# Patient Record
Sex: FEMALE | Race: WHITE | Employment: OTHER | URBAN - NONMETROPOLITAN AREA
[De-identification: names, ages, dates, MRNs, and addresses within clinical notes are randomized per-mention and may not be internally consistent; named-entity substitution may affect disease eponyms.]

---

## 2017-10-09 ENCOUNTER — OFFICE VISIT (OUTPATIENT)
Dept: INTERNAL MEDICINE CLINIC | Age: 77
End: 2017-10-09
Payer: COMMERCIAL

## 2017-10-09 ENCOUNTER — HOSPITAL ENCOUNTER (OUTPATIENT)
Age: 77
Discharge: HOME | End: 2017-10-09
Payer: MEDICARE

## 2017-10-09 VITALS
DIASTOLIC BLOOD PRESSURE: 80 MMHG | HEIGHT: 62 IN | WEIGHT: 151 LBS | BODY MASS INDEX: 27.79 KG/M2 | HEART RATE: 68 BPM | SYSTOLIC BLOOD PRESSURE: 154 MMHG

## 2017-10-09 DIAGNOSIS — E04.1 THYROID NODULE: ICD-10-CM

## 2017-10-09 DIAGNOSIS — I48.0 PAF (PAROXYSMAL ATRIAL FIBRILLATION) (HCC): ICD-10-CM

## 2017-10-09 DIAGNOSIS — I10 ESSENTIAL HYPERTENSION: ICD-10-CM

## 2017-10-09 DIAGNOSIS — E11.9 CONTROLLED TYPE 2 DIABETES MELLITUS WITHOUT COMPLICATION, WITHOUT LONG-TERM CURRENT USE OF INSULIN (HCC): ICD-10-CM

## 2017-10-09 DIAGNOSIS — E04.1 THYROID NODULE: Primary | ICD-10-CM

## 2017-10-09 LAB
T4 FREE: 1.15 NG/DL (ref 0.93–1.76)
TSH SERPL DL<=0.05 MIU/L-ACNC: 5.27 UIU/ML (ref 0.4–4.2)

## 2017-10-09 PROCEDURE — 84443 ASSAY THYROID STIM HORMONE: CPT

## 2017-10-09 PROCEDURE — 84439 ASSAY OF FREE THYROXINE: CPT

## 2017-10-09 PROCEDURE — 99204 OFFICE O/P NEW MOD 45 MIN: CPT | Performed by: INTERNAL MEDICINE

## 2017-10-09 PROCEDURE — 36415 COLL VENOUS BLD VENIPUNCTURE: CPT

## 2017-10-09 RX ORDER — AMLODIPINE BESYLATE 10 MG/1
10 TABLET ORAL NIGHTLY
Status: ON HOLD | COMMUNITY
Start: 2017-10-04 | End: 2017-12-07 | Stop reason: HOSPADM

## 2017-10-09 RX ORDER — ALLOPURINOL 300 MG/1
300 TABLET ORAL DAILY
COMMUNITY

## 2017-10-09 RX ORDER — VALSARTAN 320 MG/1
320 TABLET ORAL DAILY
Status: ON HOLD | COMMUNITY
Start: 2017-09-18 | End: 2017-12-07 | Stop reason: HOSPADM

## 2017-10-09 RX ORDER — ANASTROZOLE 1 MG/1
1 TABLET ORAL DAILY
COMMUNITY
Start: 2017-08-01 | End: 2020-01-01

## 2017-10-09 RX ORDER — UBIDECARENONE 75 MG
200 CAPSULE ORAL DAILY
COMMUNITY

## 2017-10-09 RX ORDER — SOTALOL HYDROCHLORIDE 80 MG/1
80 TABLET ORAL DAILY
COMMUNITY
Start: 2017-09-18 | End: 2017-12-05

## 2017-10-09 RX ORDER — M-VIT,TX,IRON,MINS/CALC/FOLIC 27MG-0.4MG
1 TABLET ORAL DAILY
COMMUNITY
End: 2018-05-09 | Stop reason: ALTCHOICE

## 2017-10-09 RX ORDER — APIXABAN 5 MG/1
5 TABLET, FILM COATED ORAL 2 TIMES DAILY
COMMUNITY
Start: 2017-10-04

## 2017-10-09 RX ORDER — ATORVASTATIN CALCIUM 40 MG/1
40 TABLET, FILM COATED ORAL DAILY
COMMUNITY
Start: 2017-07-27

## 2017-10-09 RX ORDER — PANTOPRAZOLE SODIUM 40 MG/1
40 TABLET, DELAYED RELEASE ORAL DAILY
COMMUNITY
Start: 2017-10-04 | End: 2018-05-09 | Stop reason: ALTCHOICE

## 2017-10-09 NOTE — PROGRESS NOTES
[facility] PROFESSIONAL [facility]  PHYSICIANS [place] [address] [place] [address] SHAYLA WEST II.ELEANOR, [place]  Dept: [phone]  Dept Fax: [phone]      Chief Complaint   Patient presents with   Esau Ramos Doctor    Hypothyroidism       Patient presents for evaluation of thyroid nodule. I have never seen the patient before. This patient is followed regularly by Dr. Aleida Klnie. Patient has a history of breast cancer and as part of the follow-up she had a PET/CT scan. This showed a nodule in her thyroid. She subsequently had an ultrasound of the thyroid. She's had no history of any thyroid disease to her knowledge. She's never had radiation or to the chest or neck. There is no history of any thyroid cancer in the family  There is no problem list on file for this patient. Past Medical History:   Diagnosis Date    [facility] ([facility]     Breast cancer ([facility]     Cancer ([facility] 06/2017    Breast    Hyperlipidemia     Hypertension     Type 2 diabetes mellitus without complication (HCC)          Current Outpatient Prescriptions   Medication Sig Dispense Refill    anastrozole (ARIMIDEX) 1 MG tablet Take 1 mg by mouth daily      pantoprazole (PROTONIX) 40 MG tablet Take 40 mg by mouth daily      amLODIPine (NORVASC) 10 MG tablet Take 10 mg by mouth daily      allopurinol (ZYLOPRIM) 300 MG tablet Take 300 mg by mouth daily      sotalol (BETAPACE) 80 MG tablet Take 80 mg by mouth daily      valsartan (DIOVAN) 320 MG tablet Take 320 mg by mouth daily      ELIQUIS 5 MG TABS tablet Take 5 mg by mouth daily      atorvastatin (LIPITOR) 40 MG tablet Take 40 mg by mouth daily      Coenzyme Q10 (CO Q-10) 100 MG CAPS Take 100 mg by mouth daily      Multiple Vitamins-Minerals (THERAPEUTIC MULTIVITAMIN-MINERALS) tablet Take 1 tablet by mouth daily       No current facility-administered medications for this visit.         Past Surgical History:   Procedure Laterality Date    CAROTID ENDARTERECTOMY  01/04/2017    MASTECTOMY Right 06/2017       Allergies   Allergen Reactions    Sulfa Antibiotics Nausea Only       Social History     Social History    Marital status: Single     Spouse name: N/A    Number of children: N/A    Years of education: N/A     Occupational History    Not on file. Social History Main Topics    Smoking status: Never Smoker    Smokeless tobacco: Never Used    Alcohol use No    Drug use: No    Sexual activity: Not on file     Other Topics Concern    Not on file     Social History Narrative    No narrative on file       History reviewed. No pertinent family history. Review of Systems - General ROS: negative  Psychological ROS: negative  Hematological and Lymphatic ROS: negative  Respiratory ROS: no cough, shortness of breath, or wheezing  Cardiovascular ROS: no chest pain or dyspnea on exertion  Gastrointestinal ROS: no abdominal pain, change in bowel habits, or black or bloody stools  Genito-Urinary ROS: no dysuria, trouble voiding, or hematuria  Musculoskeletal ROS: negative  Neurological ROS: no TIA or stroke symptoms  Dermatological ROS: negative    Blood pressure (!) 154/80, pulse 68, height 5' 2\" (1.575 m), weight 151 lb (68.5 kg).     Physical Examination: General appearance - alert, well appearing, and in no distress  Mental status - alert, oriented to person, place, and time  Neck - supple, no significant adenopathy, no thyromegaly or nodules palpable  Chest - clear to auscultation, no wheezes, rales or rhonchi, symmetric air entry  Heart - normal rate, regular rhythm, normal S1, S2, no murmurs, rubs, clicks or gallops  Abdomen - soft, nontender, nondistended, no masses or organomegaly  Neurological - alert, oriented, normal speech, no focal findings or movement disorder noted  Musculoskeletal - no joint tenderness, deformity or swelling  Extremities - peripheral pulses normal, no pedal edema, no clubbing or cyanosis  Skin - normal coloration and turgor, no rashes, no suspicious

## 2017-10-10 ENCOUNTER — TELEPHONE (OUTPATIENT)
Dept: INTERNAL MEDICINE CLINIC | Age: 77
End: 2017-10-10

## 2017-10-10 ENCOUNTER — HOSPITAL ENCOUNTER (OUTPATIENT)
Dept: ULTRASOUND IMAGING | Age: 77
Discharge: HOME | End: 2017-10-10
Payer: COMMERCIAL

## 2017-10-10 DIAGNOSIS — Z00.6 EXAMINATION FOR NORMAL COMPARISON FOR CLINICAL RESEARCH: ICD-10-CM

## 2017-10-10 PROCEDURE — 3209999900 US COMPARISON OF OUTSIDE FILMS

## 2017-10-10 NOTE — TELEPHONE ENCOUNTER
U/S guided thyroid biopsy is scheduled for 10/26/17 at 11:00am, be in main radiology at 10:30am.  Hold Eloquis 3 days prior, no fasting needed.   Per verbal of Dr. Jodi Serrano it is okay to stop the Eloquis

## 2017-10-26 ENCOUNTER — HOSPITAL ENCOUNTER (OUTPATIENT)
Dept: ULTRASOUND IMAGING | Age: 77
Discharge: HOME | End: 2017-10-26
Payer: MEDICARE

## 2017-10-26 DIAGNOSIS — E04.1 THYROID NODULE: ICD-10-CM

## 2017-10-26 PROCEDURE — 88172 CYTP DX EVAL FNA 1ST EA SITE: CPT

## 2017-10-26 PROCEDURE — 76942 ECHO GUIDE FOR BIOPSY: CPT

## 2017-10-26 PROCEDURE — 88173 CYTOPATH EVAL FNA REPORT: CPT

## 2017-10-26 PROCEDURE — 88177 CYTP FNA EVAL EA ADDL: CPT

## 2017-11-01 ENCOUNTER — OFFICE VISIT (OUTPATIENT)
Dept: INTERNAL MEDICINE CLINIC | Age: 77
End: 2017-11-01
Payer: COMMERCIAL

## 2017-11-01 ENCOUNTER — HOSPITAL ENCOUNTER (OUTPATIENT)
Age: 77
Discharge: HOME | End: 2017-11-01
Payer: COMMERCIAL

## 2017-11-01 VITALS
HEIGHT: 62 IN | HEART RATE: 72 BPM | WEIGHT: 150 LBS | BODY MASS INDEX: 27.6 KG/M2 | SYSTOLIC BLOOD PRESSURE: 180 MMHG | DIASTOLIC BLOOD PRESSURE: 84 MMHG

## 2017-11-01 DIAGNOSIS — E06.3 HASHIMOTO'S THYROIDITIS: ICD-10-CM

## 2017-11-01 DIAGNOSIS — E04.1 THYROID NODULE: Primary | ICD-10-CM

## 2017-11-01 PROCEDURE — 4040F PNEUMOC VAC/ADMIN/RCVD: CPT | Performed by: INTERNAL MEDICINE

## 2017-11-01 PROCEDURE — 3017F COLORECTAL CA SCREEN DOC REV: CPT | Performed by: INTERNAL MEDICINE

## 2017-11-01 PROCEDURE — 1036F TOBACCO NON-USER: CPT | Performed by: INTERNAL MEDICINE

## 2017-11-01 PROCEDURE — G8427 DOCREV CUR MEDS BY ELIG CLIN: HCPCS | Performed by: INTERNAL MEDICINE

## 2017-11-01 PROCEDURE — 1123F ACP DISCUSS/DSCN MKR DOCD: CPT | Performed by: INTERNAL MEDICINE

## 2017-11-01 PROCEDURE — 99213 OFFICE O/P EST LOW 20 MIN: CPT | Performed by: INTERNAL MEDICINE

## 2017-11-01 PROCEDURE — G8419 CALC BMI OUT NRM PARAM NOF/U: HCPCS | Performed by: INTERNAL MEDICINE

## 2017-11-01 PROCEDURE — 1090F PRES/ABSN URINE INCON ASSESS: CPT | Performed by: INTERNAL MEDICINE

## 2017-11-01 PROCEDURE — 86376 MICROSOMAL ANTIBODY EACH: CPT

## 2017-11-01 PROCEDURE — G8400 PT W/DXA NO RESULTS DOC: HCPCS | Performed by: INTERNAL MEDICINE

## 2017-11-01 PROCEDURE — 36415 COLL VENOUS BLD VENIPUNCTURE: CPT

## 2017-11-01 PROCEDURE — G8484 FLU IMMUNIZE NO ADMIN: HCPCS | Performed by: INTERNAL MEDICINE

## 2017-11-01 NOTE — PROGRESS NOTES
5' 2.01\" (1.575 m), weight 150 lb (68 kg). Physical Examination: Not repeated    1. Thyroid nodule     2. Hashimoto's thyroiditis  Thyroid Peroxidase Antibody       Orders Placed This Encounter   Procedures    Thyroid Peroxidase Antibody     Standing Status:   Future     Number of Occurrences:   1     Standing Expiration Date:   11/1/2018   Ultrasound of the thyroid shows a 1.7 cm nodule in the right lobe. TFTs normal.  Percutaneous biopsy showed  benign findings, possible lymphocytic thyroiditis.   We'll check TPO antibody    I  recommend repeat ultrasound 6 months  I discussed the situation at length with patient and family and answered all their questions  Follow-up with me 3 months

## 2017-11-02 LAB — THYROID PEROXIDASE ANTIBODY: 0.8 IU/ML (ref 0–9)

## 2017-12-05 ENCOUNTER — APPOINTMENT (OUTPATIENT)
Dept: GENERAL RADIOLOGY | Age: 77
DRG: 641 | End: 2017-12-05
Payer: MEDICARE

## 2017-12-05 ENCOUNTER — HOSPITAL ENCOUNTER (INPATIENT)
Age: 77
LOS: 2 days | Discharge: HOME | DRG: 641 | End: 2017-12-07
Attending: INTERNAL MEDICINE | Admitting: INTERNAL MEDICINE
Payer: MEDICARE

## 2017-12-05 DIAGNOSIS — D72.829 LEUKOCYTOSIS, UNSPECIFIED TYPE: ICD-10-CM

## 2017-12-05 DIAGNOSIS — E03.9 HYPOTHYROIDISM, UNSPECIFIED TYPE: ICD-10-CM

## 2017-12-05 DIAGNOSIS — R55 NEAR SYNCOPE: Primary | ICD-10-CM

## 2017-12-05 PROBLEM — E11.9 DIABETES TYPE 2, CONTROLLED (HCC): Status: ACTIVE | Noted: 2017-12-05

## 2017-12-05 PROBLEM — I73.9 PAD (PERIPHERAL ARTERY DISEASE) (HCC): Status: ACTIVE | Noted: 2017-12-05

## 2017-12-05 PROBLEM — I25.10 CAD (CORONARY ARTERY DISEASE): Status: ACTIVE | Noted: 2017-12-05

## 2017-12-05 PROBLEM — Z92.29 HX: ANTICOAGULATION: Status: ACTIVE | Noted: 2017-12-05

## 2017-12-05 PROBLEM — I48.21 ATRIAL FIBRILLATION, PERMANENT (HCC): Status: ACTIVE | Noted: 2017-12-05

## 2017-12-05 PROBLEM — Z87.19 HISTORY OF GASTROESOPHAGEAL REFLUX (GERD): Status: ACTIVE | Noted: 2017-12-05

## 2017-12-05 PROBLEM — C50.919 BREAST CANCER (HCC): Status: ACTIVE | Noted: 2017-12-05

## 2017-12-05 PROBLEM — I10 ESSENTIAL HYPERTENSION: Status: ACTIVE | Noted: 2017-12-05

## 2017-12-05 LAB
ALBUMIN SERPL-MCNC: 4.3 G/DL (ref 3.5–5.1)
ALP BLD-CCNC: 105 U/L (ref 38–126)
ALT SERPL-CCNC: 18 U/L (ref 11–66)
ANION GAP SERPL CALCULATED.3IONS-SCNC: 18 MEQ/L (ref 8–16)
AST SERPL-CCNC: 19 U/L (ref 5–40)
BACTERIA: ABNORMAL /HPF
BASOPHILS # BLD: 0.7 %
BASOPHILS ABSOLUTE: 0.1 THOU/MM3 (ref 0–0.1)
BILIRUB SERPL-MCNC: 0.6 MG/DL (ref 0.3–1.2)
BILIRUBIN DIRECT: < 0.2 MG/DL (ref 0–0.3)
BILIRUBIN URINE: ABNORMAL
BLOOD, URINE: NEGATIVE
BUN BLDV-MCNC: 13 MG/DL (ref 7–22)
CALCIUM SERPL-MCNC: 9.6 MG/DL (ref 8.5–10.5)
CASTS 2: ABNORMAL /LPF
CASTS UA: ABNORMAL /LPF
CHARACTER, URINE: ABNORMAL
CHLORIDE BLD-SCNC: 100 MEQ/L (ref 98–111)
CO2: 21 MEQ/L (ref 23–33)
COLOR: YELLOW
CREAT SERPL-MCNC: 1.3 MG/DL (ref 0.4–1.2)
CRYSTALS, UA: ABNORMAL
EKG ATRIAL RATE: 75 BPM
EKG P AXIS: 15 DEGREES
EKG P-R INTERVAL: 180 MS
EKG Q-T INTERVAL: 380 MS
EKG QRS DURATION: 84 MS
EKG QTC CALCULATION (BAZETT): 424 MS
EKG R AXIS: 21 DEGREES
EKG T AXIS: 75 DEGREES
EKG VENTRICULAR RATE: 75 BPM
EOSINOPHIL # BLD: 0.7 %
EOSINOPHILS ABSOLUTE: 0.1 THOU/MM3 (ref 0–0.4)
EPITHELIAL CELLS, UA: ABNORMAL /HPF
FLU A ANTIGEN: NEGATIVE
FLU B ANTIGEN: NEGATIVE
GFR SERPL CREATININE-BSD FRML MDRD: 40 ML/MIN/1.73M2
GLUCOSE BLD-MCNC: 122 MG/DL (ref 70–108)
GLUCOSE URINE: NEGATIVE MG/DL
HCT VFR BLD CALC: 40.1 % (ref 37–47)
HEMOGLOBIN: 13.1 GM/DL (ref 12–16)
ICTOTEST: NEGATIVE
INR BLD: 1.43 (ref 0.85–1.13)
KETONES, URINE: NEGATIVE
LEUKOCYTE ESTERASE, URINE: ABNORMAL
LIPASE: 83.6 U/L (ref 5.6–51.3)
LYMPHOCYTES # BLD: 7.9 %
LYMPHOCYTES ABSOLUTE: 1.2 THOU/MM3 (ref 1–4.8)
MCH RBC QN AUTO: 30.3 PG (ref 27–31)
MCHC RBC AUTO-ENTMCNC: 32.7 GM/DL (ref 33–37)
MCV RBC AUTO: 92.5 FL (ref 81–99)
MISCELLANEOUS 2: ABNORMAL
MONOCYTES # BLD: 6.7 %
MONOCYTES ABSOLUTE: 1 THOU/MM3 (ref 0.4–1.3)
MUCUS: ABNORMAL
NITRITE, URINE: NEGATIVE
NUCLEATED RED BLOOD CELLS: 0 /100 WBC
OSMOLALITY CALCULATION: 279 MOSMOL/KG (ref 275–300)
PDW BLD-RTO: 14.1 % (ref 11.5–14.5)
PH UA: 5
PLATELET # BLD: 280 THOU/MM3 (ref 130–400)
PMV BLD AUTO: 8.4 MCM (ref 7.4–10.4)
POTASSIUM SERPL-SCNC: 4.4 MEQ/L (ref 3.5–5.2)
PROTEIN UA: 30
RBC # BLD: 4.33 MILL/MM3 (ref 4.2–5.4)
RBC URINE: ABNORMAL /HPF
RENAL EPITHELIAL, UA: ABNORMAL
SEG NEUTROPHILS: 84 %
SEGMENTED NEUTROPHILS ABSOLUTE COUNT: 12.3 THOU/MM3 (ref 1.8–7.7)
SODIUM BLD-SCNC: 139 MEQ/L (ref 135–145)
SPECIFIC GRAVITY, URINE: 1.02 (ref 1–1.03)
TOTAL PROTEIN: 7.4 G/DL (ref 6.1–8)
TROPONIN T: < 0.01 NG/ML
TSH SERPL DL<=0.05 MIU/L-ACNC: 8.38 UIU/ML (ref 0.4–4.2)
UROBILINOGEN, URINE: 0.2 EU/DL
WBC # BLD: 14.7 THOU/MM3 (ref 4.8–10.8)
WBC UA: ABNORMAL /HPF
YEAST: ABNORMAL

## 2017-12-05 PROCEDURE — 82248 BILIRUBIN DIRECT: CPT

## 2017-12-05 PROCEDURE — 85610 PROTHROMBIN TIME: CPT

## 2017-12-05 PROCEDURE — 81001 URINALYSIS AUTO W/SCOPE: CPT

## 2017-12-05 PROCEDURE — 84443 ASSAY THYROID STIM HORMONE: CPT

## 2017-12-05 PROCEDURE — 93005 ELECTROCARDIOGRAM TRACING: CPT

## 2017-12-05 PROCEDURE — 71020 XR CHEST STANDARD TWO VW: CPT

## 2017-12-05 PROCEDURE — 87086 URINE CULTURE/COLONY COUNT: CPT

## 2017-12-05 PROCEDURE — 99220 PR INITIAL OBSERVATION CARE/DAY 70 MINUTES: CPT | Performed by: NURSE PRACTITIONER

## 2017-12-05 PROCEDURE — G0378 HOSPITAL OBSERVATION PER HR: HCPCS

## 2017-12-05 PROCEDURE — 83690 ASSAY OF LIPASE: CPT

## 2017-12-05 PROCEDURE — 87804 INFLUENZA ASSAY W/OPTIC: CPT

## 2017-12-05 PROCEDURE — 1200000003 HC TELEMETRY R&B

## 2017-12-05 PROCEDURE — 84484 ASSAY OF TROPONIN QUANT: CPT

## 2017-12-05 PROCEDURE — 80053 COMPREHEN METABOLIC PANEL: CPT

## 2017-12-05 PROCEDURE — 99285 EMERGENCY DEPT VISIT HI MDM: CPT

## 2017-12-05 PROCEDURE — 85025 COMPLETE CBC W/AUTO DIFF WBC: CPT

## 2017-12-05 PROCEDURE — 36415 COLL VENOUS BLD VENIPUNCTURE: CPT

## 2017-12-05 RX ORDER — NICOTINE POLACRILEX 4 MG
15 LOZENGE BUCCAL PRN
Status: DISCONTINUED | OUTPATIENT
Start: 2017-12-05 | End: 2017-12-07 | Stop reason: HOSPADM

## 2017-12-05 RX ORDER — METFORMIN HYDROCHLORIDE 500 MG/1
500 TABLET, EXTENDED RELEASE ORAL
COMMUNITY

## 2017-12-05 RX ORDER — ONDANSETRON 2 MG/ML
4 INJECTION INTRAMUSCULAR; INTRAVENOUS EVERY 6 HOURS PRN
Status: DISCONTINUED | OUTPATIENT
Start: 2017-12-05 | End: 2017-12-07 | Stop reason: HOSPADM

## 2017-12-05 RX ORDER — ANASTROZOLE 1 MG/1
1 TABLET ORAL DAILY
Status: DISCONTINUED | OUTPATIENT
Start: 2017-12-06 | End: 2017-12-07 | Stop reason: HOSPADM

## 2017-12-05 RX ORDER — SODIUM CHLORIDE 0.9 % (FLUSH) 0.9 %
10 SYRINGE (ML) INJECTION EVERY 12 HOURS SCHEDULED
Status: DISCONTINUED | OUTPATIENT
Start: 2017-12-05 | End: 2017-12-07 | Stop reason: HOSPADM

## 2017-12-05 RX ORDER — ALLOPURINOL 300 MG/1
300 TABLET ORAL DAILY
Status: DISCONTINUED | OUTPATIENT
Start: 2017-12-06 | End: 2017-12-07 | Stop reason: HOSPADM

## 2017-12-05 RX ORDER — AMLODIPINE BESYLATE 10 MG/1
10 TABLET ORAL DAILY
Status: DISCONTINUED | OUTPATIENT
Start: 2017-12-06 | End: 2017-12-06

## 2017-12-05 RX ORDER — DOCUSATE SODIUM 100 MG/1
100 CAPSULE, LIQUID FILLED ORAL 2 TIMES DAILY PRN
Status: DISCONTINUED | OUTPATIENT
Start: 2017-12-05 | End: 2017-12-07 | Stop reason: HOSPADM

## 2017-12-05 RX ORDER — ACETAMINOPHEN 325 MG/1
650 TABLET ORAL EVERY 4 HOURS PRN
Status: DISCONTINUED | OUTPATIENT
Start: 2017-12-05 | End: 2017-12-05 | Stop reason: SDUPTHER

## 2017-12-05 RX ORDER — ACETAMINOPHEN 325 MG/1
650 TABLET ORAL EVERY 4 HOURS PRN
Status: DISCONTINUED | OUTPATIENT
Start: 2017-12-05 | End: 2017-12-07 | Stop reason: HOSPADM

## 2017-12-05 RX ORDER — SODIUM CHLORIDE 0.9 % (FLUSH) 0.9 %
10 SYRINGE (ML) INJECTION PRN
Status: DISCONTINUED | OUTPATIENT
Start: 2017-12-05 | End: 2017-12-07 | Stop reason: HOSPADM

## 2017-12-05 RX ORDER — PANTOPRAZOLE SODIUM 40 MG/1
40 TABLET, DELAYED RELEASE ORAL
Status: DISCONTINUED | OUTPATIENT
Start: 2017-12-06 | End: 2017-12-07 | Stop reason: HOSPADM

## 2017-12-05 RX ORDER — SODIUM CHLORIDE 0.9 % (FLUSH) 0.9 %
10 SYRINGE (ML) INJECTION PRN
Status: DISCONTINUED | OUTPATIENT
Start: 2017-12-05 | End: 2017-12-05 | Stop reason: SDUPTHER

## 2017-12-05 RX ORDER — DEXTROSE AND SODIUM CHLORIDE 5; .9 G/100ML; G/100ML
100 INJECTION, SOLUTION INTRAVENOUS PRN
Status: DISCONTINUED | OUTPATIENT
Start: 2017-12-05 | End: 2017-12-07 | Stop reason: HOSPADM

## 2017-12-05 RX ORDER — ONDANSETRON 2 MG/ML
4 INJECTION INTRAMUSCULAR; INTRAVENOUS EVERY 8 HOURS PRN
Status: DISCONTINUED | OUTPATIENT
Start: 2017-12-05 | End: 2017-12-06 | Stop reason: SDUPTHER

## 2017-12-05 RX ORDER — M-VIT,TX,IRON,MINS/CALC/FOLIC 27MG-0.4MG
1 TABLET ORAL DAILY
Status: DISCONTINUED | OUTPATIENT
Start: 2017-12-06 | End: 2017-12-07 | Stop reason: HOSPADM

## 2017-12-05 RX ORDER — VALSARTAN 320 MG/1
320 TABLET ORAL DAILY
Status: DISCONTINUED | OUTPATIENT
Start: 2017-12-06 | End: 2017-12-06

## 2017-12-05 RX ORDER — AMIODARONE HYDROCHLORIDE 200 MG/1
200 TABLET ORAL DAILY
Status: DISCONTINUED | OUTPATIENT
Start: 2017-12-06 | End: 2017-12-07 | Stop reason: HOSPADM

## 2017-12-05 RX ORDER — DIMENHYDRINATE 50 MG
100 TABLET ORAL DAILY
Status: DISCONTINUED | OUTPATIENT
Start: 2017-12-06 | End: 2017-12-06 | Stop reason: RX

## 2017-12-05 RX ORDER — AMIODARONE HYDROCHLORIDE 200 MG/1
200 TABLET ORAL DAILY
COMMUNITY
End: 2018-05-09 | Stop reason: ALTCHOICE

## 2017-12-05 RX ORDER — DEXTROSE MONOHYDRATE 25 G/50ML
12.5 INJECTION, SOLUTION INTRAVENOUS PRN
Status: DISCONTINUED | OUTPATIENT
Start: 2017-12-05 | End: 2017-12-07 | Stop reason: HOSPADM

## 2017-12-05 RX ORDER — SODIUM CHLORIDE 0.9 % (FLUSH) 0.9 %
10 SYRINGE (ML) INJECTION EVERY 12 HOURS SCHEDULED
Status: DISCONTINUED | OUTPATIENT
Start: 2017-12-05 | End: 2017-12-05 | Stop reason: SDUPTHER

## 2017-12-05 RX ORDER — CIPROFLOXACIN 2 MG/ML
400 INJECTION, SOLUTION INTRAVENOUS EVERY 12 HOURS
Status: DISCONTINUED | OUTPATIENT
Start: 2017-12-06 | End: 2017-12-06

## 2017-12-05 RX ORDER — ATORVASTATIN CALCIUM 40 MG/1
40 TABLET, FILM COATED ORAL NIGHTLY
Status: DISCONTINUED | OUTPATIENT
Start: 2017-12-06 | End: 2017-12-07 | Stop reason: HOSPADM

## 2017-12-05 ASSESSMENT — ENCOUNTER SYMPTOMS
STRIDOR: 0
ABDOMINAL DISTENTION: 0
RHINORRHEA: 0
DIARRHEA: 0
PHOTOPHOBIA: 0
COLOR CHANGE: 0
NAUSEA: 0
EYE DISCHARGE: 0
COUGH: 0
ABDOMINAL PAIN: 0
VOMITING: 0
FACIAL SWELLING: 0
EYE REDNESS: 0
SHORTNESS OF BREATH: 0

## 2017-12-05 NOTE — ED NOTES
Pt unable to provide urine sample. Pt refusing to be cathed at this time.       [facility]  12/05/17 8975

## 2017-12-05 NOTE — Clinical Note
Patient Class: Inpatient [101]   REQUIRED: Diagnosis: Near syncope [[id number]]   Estimated Length of Stay: Estimated stay of more than 2 midnights   Future Attending Provider: Isaias Fleming [[id number]]   Admitting Provider: Isaias Fleming [[id number]]   Telemetry Bed Required?: Yes

## 2017-12-05 NOTE — ED PROVIDER NOTES
[facility]  eMERGENCY dEPARTMENT eNCOUnter          CHIEF COMPLAINT       Chief Complaint   Patient presents with    Dizziness       Nurses Notes reviewed and I agree except as noted in the HPI. HISTORY OF PRESENT ILLNESS    Jesus Alberto Murillo is a 69 y.o. female who presents to the Emergency Department for the evaluation of a presyncopal episode. The patient was at [address] today walking when she had to stop and rest, which is abnormal for her. Per friend, the patient got very pale, diaphoretic and cool to touch at that time. She states the patient began breathing really hard. The patient states she had to stop walking because she felt as though she was going to pass out. She denies feeling palpitations, chest pain or shortness of breath at that time, but states she was very lightheaded. The patient states after sitting down she felt dizzy, but states it subsided on her own. She states this lasted about 10 minutes overall. The patient denies abdominal pain, nausea, vomiting or diarrhea. She states she feels a lot better now, but she feels weak. The patient states she has not felt ill prior today. She states she had just gotten done eating lunch before this happened. The patient reports having \"darker\" stool, but she does not recall if it has been black. The patient states she drinks a lot of water daily. The patient follows Dr. Melodie Sykes in [facility] as her cardiologist. She states she was recently admitted secondary to her afib and hyponatremia and was discharged home on a halter monitor. The patient had been on HCTZ and was later diagnosed with hyponatremia so she was taken off of it. She states she had an endarterectomy done on the left carotid.     Location/Symptom: presyncopal episode  Timing/Onset: today  Context/Setting: was walking around at [address] when she felt as though she was going to pass out and had to sit down  Quality: reports feeling as though she would pass out, and per sister was pale, Psychiatric: She has a normal mood and affect. Her behavior is normal. Thought content normal.     DIFFERENTIAL DIAGNOSIS:   electrolyte imbalance, ACS, dehydration, anemia    DIAGNOSTIC RESULTS     EKG: All EKG's are interpreted by the Emergency Department Physician who either signs or Co-signs this chart in the absence of a cardiologist.    Jigneshrt Hernandez. Rate: 75 bpm  OK interval: 180 ms  QRS duration: 84 ms  QTc: 424 ms  P-R-T axes: 15, 21, 75  Normal sinus rhythm  No STEMI    RADIOLOGY: non-plain film images(s) such as CT, Ultrasound and MRI are read by the radiologist.    XR CHEST STANDARD (2 VW)   Final Result   No acute cardiopulmonary disease            **This report has been created using voice recognition software. It may contain minor errors which are inherent in voice recognition technology. **      Final report electronically signed by Dr. Cookie Chen on 12/5/2017 3:30 PM        LABS:     Labs Reviewed   CBC WITH AUTO DIFFERENTIAL - Abnormal; Notable for the following:        Result Value    WBC 14.7 (*)     MCHC 32.7 (*)     Segs Absolute 12.3 (*)     All other components within normal limits   PROTIME-INR - Abnormal; Notable for the following:     INR 1.43 (*)     All other components within normal limits   BASIC METABOLIC PANEL - Abnormal; Notable for the following:     CO2 21 (*)     Glucose 122 (*)     CREATININE 1.3 (*)     All other components within normal limits   LIPASE - Abnormal; Notable for the following:     Lipase 83.6 (*)     All other components within normal limits   TSH WITHOUT REFLEX - Abnormal; Notable for the following:     TSH 8.380 (*)     All other components within normal limits   ANION GAP - Abnormal; Notable for the following:      Anion Gap 18.0 (*)     All other components within normal limits   GLOMERULAR FILTRATION RATE, ESTIMATED - Abnormal; Notable for the following:     Est, Glom Filt Rate 40 (*)     All other components within normal limits   URINE WITH REFLEXED MICRO - Abnormal; Notable for the following:     Bilirubin Urine SMALL (*)     Protein, UA 30 (*)     Leukocyte Esterase, Urine MODERATE (*)     Character, Urine CLOUDY (*)     All other components within normal limits   RAPID INFLUENZA A/B ANTIGENS   URINE CULTURE, REFLEXED    Narrative:     Source: urine       Site: unknown collect          Current Antibiotics: not stated   HEPATIC FUNCTION PANEL   TROPONIN   OSMOLALITY   BILE ACIDS, TOTAL       EMERGENCY DEPARTMENT COURSE:   Vitals:    Vitals:    12/05/17 1406 12/05/17 1607 12/05/17 1743 12/05/17 1820   BP: 129/65 (!) 140/61 131/64 136/62   Pulse: 74 75 100 74   Resp: 18 16 18 16   Temp: 97.7 °F (36.5 °C)      TempSrc: Oral      SpO2: 100% 97% 94% 98%   Weight: 143 lb (64.9 kg)      Height: 5' 2\" (1.575 m)        2:39 PM: The patient was seen and evaluated. MDM:  The patient's white blood cell count is elevated at 14.7. I do not have a source of infection. Her chest x-ray is normal.  She has no urinary symptoms and is unable to provide us a urine sample prior to admission. She has no sore throat, meningeal signs, sinus concerns, abdominal pain, and her influenza was negative. She is not anemic so I do not think that she's had blood loss from being on her Eliquis. The patient has no neuro symptoms. She has no headache, no dizziness right now, her pupils are PERRL and she has no noted neurologic deficits. The patient's electrolytes and liver enzymes are normal.  Her lipase is elevated at 83.6. She had a noted TSH of 8.38. She states her TSH has been elevated over 9 in the past and Dr. Leslee Landis has been aware of this. He does not feel that she needs to be placed on any thyroid replacement at this time. Dr. Maricel Boss was consulted with all of the results. He graciously accepted the patient for admission. She will be admitted to the hospital for further workup and treatment per Dr. Maricel Boss.     CRITICAL CARE:   None     CONSULTS:   Lenin    PROCEDURES:  None    FINAL IMPRESSION      1. Near syncope    2. Leukocytosis, unspecified type    3. Hypothyroidism, unspecified type          DISPOSITION/PLAN   Admit    PATIENT REFERRED TO:  Rose Mary Lepe  [address] [place], [place] [address] [phone]            DISCHARGE MEDICATIONS:  New Prescriptions    No medications on file       (Please note that portions of this note were completed with a voice recognition program.  Efforts were made to edit the dictations but occasionally words are mis-transcribed.)    The patient was given an opportunity to see the Emergency Attending. The patient voiced understanding that I was a Mid-Level Provider and was in agreement with being seen independently by myself. Scribe:  Altagracia Henson 12/5/17 2:39 PM Scribing for and in the presence of Greg Quintana CNP. Signed by: Martin Blackwell, 12/05/17 6:31 PM    Provider:  I personally performed the services described in the documentation, reviewed and edited the documentation which was dictated to the scribe in my presence, and it accurately records my words and actions.     Greg Quintana CNP 12/5/17 6:31 PM        TONEY Estrada  12/05/17 1413

## 2017-12-05 NOTE — ED NOTES
Pt resting on cart. Updated on admission plan. Denies needs at this time. Call light in reach. Resp easy, unlabored.       [facility]  12/05/17 1173

## 2017-12-05 NOTE — ED NOTES
Pt resting on cart, denies needs at this time. Call light in reach. Resp easy, unlabored.       Dominique Hernandez, [facility]  12/05/17 2863

## 2017-12-05 NOTE — ED NOTES
Bed: 023A  Expected date: 12/5/17  Expected time:   Means of arrival: [facility]  Comments:  Vee Barnard  12/05/17 0197

## 2017-12-06 LAB
ANION GAP SERPL CALCULATED.3IONS-SCNC: 13 MEQ/L (ref 8–16)
BUN BLDV-MCNC: 14 MG/DL (ref 7–22)
CALCIUM SERPL-MCNC: 9.3 MG/DL (ref 8.5–10.5)
CHLORIDE BLD-SCNC: 100 MEQ/L (ref 98–111)
CO2: 25 MEQ/L (ref 23–33)
CREAT SERPL-MCNC: 1.1 MG/DL (ref 0.4–1.2)
GFR SERPL CREATININE-BSD FRML MDRD: 48 ML/MIN/1.73M2
GLUCOSE BLD-MCNC: 102 MG/DL (ref 70–108)
GLUCOSE BLD-MCNC: 135 MG/DL (ref 70–108)
GLUCOSE BLD-MCNC: 193 MG/DL (ref 70–108)
GLUCOSE BLD-MCNC: 82 MG/DL (ref 70–108)
GLUCOSE BLD-MCNC: 96 MG/DL (ref 70–108)
HCT VFR BLD CALC: 35.9 % (ref 37–47)
HEMOGLOBIN: 12.1 GM/DL (ref 12–16)
MCH RBC QN AUTO: 31.2 PG (ref 27–31)
MCHC RBC AUTO-ENTMCNC: 33.8 GM/DL (ref 33–37)
MCV RBC AUTO: 92.3 FL (ref 81–99)
ORGANISM: ABNORMAL
PDW BLD-RTO: 14.5 % (ref 11.5–14.5)
PLATELET # BLD: 255 THOU/MM3 (ref 130–400)
PMV BLD AUTO: 8.2 MCM (ref 7.4–10.4)
POTASSIUM SERPL-SCNC: 4.4 MEQ/L (ref 3.5–5.2)
RBC # BLD: 3.89 MILL/MM3 (ref 4.2–5.4)
SODIUM BLD-SCNC: 138 MEQ/L (ref 135–145)
T4 FREE: 1.59 NG/DL (ref 0.93–1.76)
URINE CULTURE REFLEX: ABNORMAL
WBC # BLD: 8.9 THOU/MM3 (ref 4.8–10.8)

## 2017-12-06 PROCEDURE — 2580000003 HC RX 258: Performed by: PHYSICIAN ASSISTANT

## 2017-12-06 PROCEDURE — 84439 ASSAY OF FREE THYROXINE: CPT

## 2017-12-06 PROCEDURE — 6370000000 HC RX 637 (ALT 250 FOR IP): Performed by: NURSE PRACTITIONER

## 2017-12-06 PROCEDURE — 36415 COLL VENOUS BLD VENIPUNCTURE: CPT

## 2017-12-06 PROCEDURE — 80048 BASIC METABOLIC PNL TOTAL CA: CPT

## 2017-12-06 PROCEDURE — 1200000003 HC TELEMETRY R&B

## 2017-12-06 PROCEDURE — 82948 REAGENT STRIP/BLOOD GLUCOSE: CPT

## 2017-12-06 PROCEDURE — 85027 COMPLETE CBC AUTOMATED: CPT

## 2017-12-06 PROCEDURE — 2580000003 HC RX 258: Performed by: NURSE PRACTITIONER

## 2017-12-06 PROCEDURE — 99233 SBSQ HOSP IP/OBS HIGH 50: CPT | Performed by: PHYSICIAN ASSISTANT

## 2017-12-06 PROCEDURE — 6360000002 HC RX W HCPCS: Performed by: NURSE PRACTITIONER

## 2017-12-06 RX ORDER — ISOSORBIDE MONONITRATE 30 MG/1
30 TABLET, EXTENDED RELEASE ORAL DAILY
Status: DISCONTINUED | OUTPATIENT
Start: 2017-12-06 | End: 2017-12-06 | Stop reason: CLARIF

## 2017-12-06 RX ORDER — SODIUM CHLORIDE 9 MG/ML
INJECTION, SOLUTION INTRAVENOUS CONTINUOUS
Status: DISCONTINUED | OUTPATIENT
Start: 2017-12-06 | End: 2017-12-06

## 2017-12-06 RX ORDER — 0.9 % SODIUM CHLORIDE 0.9 %
500 INTRAVENOUS SOLUTION INTRAVENOUS ONCE
Status: COMPLETED | OUTPATIENT
Start: 2017-12-06 | End: 2017-12-06

## 2017-12-06 RX ADMIN — INSULIN LISPRO 1 UNITS: 100 INJECTION, SOLUTION INTRAVENOUS; SUBCUTANEOUS at 12:11

## 2017-12-06 RX ADMIN — AMLODIPINE BESYLATE 10 MG: 10 TABLET ORAL at 00:59

## 2017-12-06 RX ADMIN — VALSARTAN 320 MG: 320 TABLET ORAL at 07:56

## 2017-12-06 RX ADMIN — PANTOPRAZOLE SODIUM 40 MG: 40 TABLET, DELAYED RELEASE ORAL at 03:56

## 2017-12-06 RX ADMIN — ALLOPURINOL 300 MG: 300 TABLET ORAL at 07:54

## 2017-12-06 RX ADMIN — Medication 10 ML: at 03:54

## 2017-12-06 RX ADMIN — ANASTROZOLE 1 MG: 1 TABLET ORAL at 07:55

## 2017-12-06 RX ADMIN — ATORVASTATIN CALCIUM 40 MG: 40 TABLET, FILM COATED ORAL at 21:33

## 2017-12-06 RX ADMIN — SODIUM CHLORIDE 500 ML: 9 INJECTION, SOLUTION INTRAVENOUS at 13:59

## 2017-12-06 RX ADMIN — Medication 10 ML: at 07:56

## 2017-12-06 RX ADMIN — ATORVASTATIN CALCIUM 40 MG: 40 TABLET, FILM COATED ORAL at 01:00

## 2017-12-06 RX ADMIN — CIPROFLOXACIN 400 MG: 2 INJECTION, SOLUTION INTRAVENOUS at 03:53

## 2017-12-06 RX ADMIN — AMIODARONE HYDROCHLORIDE 200 MG: 200 TABLET ORAL at 07:54

## 2017-12-06 RX ADMIN — Medication 10 ML: at 21:32

## 2017-12-06 ASSESSMENT — PAIN SCALES - GENERAL
PAINLEVEL_OUTOF10: 0
PAINLEVEL_OUTOF10: 0

## 2017-12-06 NOTE — H&P
She stated as soon as she got into the  cool air, she felt a 1000% better. She denied any shortness of breath. No  complaints of chest pain, pressure, heaviness or tightness. She denied any  type of heart palpitations. She stated that this did not feel like when  she has had other experiences when her heart has been \"out of sink. \"  The  patient was transported to [facility]. On arrival, her temperature was  97.7, heart rate 74, respiratory rate 18, blood pressure was 129/65. While  in the emergency room, her blood work was drawn and it was noted her TSH  was 8.38. The patient tells me that while she was hospitalized at  [facility], her TSH was noted to be 9. The patient was not started  on any medications at that time. She states that she had attempted to  obtain Endocrinology appointment with Dr. Reagan Genao, however, she was not able  to see anyone until 02/2018. The patient states that her primary care  physician chose not to treat this level at this time. Urinalysis did show  moderate amount of leukocytes with a few bacteria; however, the patient is  asymptomatic. I did evaluate the patient while in the emergency room. The  patient was a fairly good historian. She again denies any complaints of  chest pain, pressure, heaviness or tightness. She denies any abdominal  pain and no CVA tenderness. PAST MEDICAL HISTORY:  Significant for breast cancer status post right  mastectomy 06/2017, atrial fibrillation, chronic. The patient tells me she  has never had a cardioversion, she has never had an ablation; however,  recently she was sent as a referral to EP, Dr. Francisca Banuelos at [facility] for possible  evaluation for ablation. The patient tells me that she did have a cardiac  catheterization earlier this year and was found to have nonobstructive  coronary artery disease. She did undergo a left carotid endarterectomy.    Essential hypertension, hyponatremia, diabetes mellitus type 2, DVT in  1972, provoked and a few bacteria in her urine with no CVA or complaints of  suprapubic pressure or frequency complaints. It is noted that she has been  hospitalized twice at [facility] within the last 2 to 3  weeks, first was for atrial fibrillation where medication changes had been  adjusted. The patient had been started on amiodarone and the most recent  was 2 days prior to admission for hyponatremia secondary to  hydrochlorothiazide. The patient denies any antibiotic exposure or Chandler  catheter use. Orthostatic heart rate and blood pressure are pending. We  will monitor the patient overnight. I had asked for old records from  [facility] and we will continue to monitor. 2.  Elevated TSH. The patient tells me that it sounds as if she has had an  elevated TSH for sometime now. She says that when she was at [facility], her TSH was 9; however, no treatment was chosen at that  time. She was to seek Endocrinology input; however, she attempted to reach  out to Dr. Jael Espinosa, however, unable to obtain an appointment until 2018. Free T4 is pending. Considering the patient is new to amiodarone, we may  need to consider treatment. We also may need to consider Endocrinology  input. We will wait for these labs and follow accordingly. 3.  Leukocytosis. We will go ahead and treat this as possible UTI    Urine  culture is pending at this time. We will start the patient on Cipro for  cystitis with no hematuria. 4.  Atrial fibrillation, chronic. The patient tells me atrial fibrillation  has an onset of 2016. No history of cardioversion. No history of an  ablation; however, recently there was a referral made to [facility] to Dr. Rani Rebolledo, ET doctor for possible ablation. The patient did undergo cardiac  catheterization beginning of the year where she does have nonobstructive  coronary artery disease.   She is followed by Dr. Arleen Jaquez, Cardiology at  [facility].  The patient is currently on amiodarone. She is  also on Eliquis. 5.  Essential hypertension. Her Diovan has been continued, as well as her  Norvasc with parameters to hold. 6.  Nonobstructive coronary artery disease. The patient is not on aspirin;  however, she is on Eliquis. This will be monitored. 7.  Breast cancer status post right mastectomy 06/2017. Her Arimidex has  been continued. 8.  PAD. She is status post left carotid endarterectomy. This was in  01/2017. This will be monitored. 9.  DVT, history, provoked. This was back in 1972. 10.  Gastroesophageal reflux disease. Her Protonix has been continued. 11.  Diabetes mellitus type 2. The patient is on metformin. I did not  restart this at this time. We will monitor with Accu-Cheks and sliding  scale insulin and reevaluate for likely possible discharge to restart  tomorrow at that time. 12.  Recent hyponatremia, hospitalization. This has been stable and her  sodium on admission was 139. PLAN:  The patient is a full code. She has been admitted as observation  and we will repeat labs in the morning. Further recommendations based on results of diagnostic testing and the patient's  clinical course. Orrin Hacking Osker Riedel, CNP    D: 12/05/2017 21:38:00       T: 12/06/2017 0:56:12     [email or web address]  Job#: [id number]     Doc#: [id number]    CC:

## 2017-12-06 NOTE — CARE COORDINATION
[birth year], 3:24 AM      Jess Ball       Admitted from: ER 12/5/2017/ [address] [address] day: 1   Location: [facility]27/027 Reason for admit: Near syncope [R55]  Near syncope [R55] Status: Observation  Admit order signed?: no  PMH:  has a past medical history of Atrial fibrillation ([facility] Blood circulation, collateral; [facility] CAD (coronary artery disease); Cancer [facility]); GERD (gastroesophageal reflux disease); Hyperlipidemia; Hypertension; Other disorders of kidney and ureter in diseases classified elsewhere; Thyroid disease; and Type 2 diabetes mellitus without complication ([facility] Procedure: No  Pertinent abnormal Imaging:No  Medications:  Scheduled Meds:   allopurinol  300 mg Oral Daily    amiodarone  200 mg Oral Daily    amLODIPine  10 mg Oral Daily    anastrozole  1 mg Oral Daily    atorvastatin  40 mg Oral Nightly    apixaban  5 mg Oral BID    therapeutic multivitamin-minerals  1 tablet Oral Daily    pantoprazole  40 mg Oral QAM AC    valsartan  320 mg Oral Daily    sodium chloride flush  10 mL Intravenous 2 times per day    ciprofloxacin  400 mg Intravenous Q12H    insulin lispro  0-6 Units Subcutaneous TID WC    insulin lispro  0-3 Units Subcutaneous Nightly     Continuous Infusions:   dextrose 5 % and 0.9 % NaCl        Pertinent Info/Orders/Treatment Plan:  VS. I&O. IV fluids. IV antibiotics. Monitor and treat blood glucose. Diet: DIET CARB CONTROL; Carb Control: 4 carbs/meal (approximate 1800 kcals/day); Low Sodium (2 GM)   DVT Prophylaxis: Eliquis. Smoking status:  reports that she has never smoked.  She has never used smokeless tobacco.   Influenza Vaccination Screening Completed: yes  Pneumonia Vaccination Screening Completed: yes  Core measures: VTE  PCP: Radha JAQUEZ  Readmission:   No  Risk Score:       Discharge Planning  Current Residence:  Private Residence  Living Arrangements:  Family Members   Support Systems:  Family Members, Children  Current Services PTA:     Potential

## 2017-12-06 NOTE — FLOWSHEET NOTE
12/06/17 1645   Provider Notification   Reason for Communication Review case   Provider Name [address]   Provider Notification Physician Assistant   Method of Communication Face to face   Response In department   Notification Time [place] on floor to see patient.

## 2017-12-06 NOTE — PROGRESS NOTES
Hospitalist Progress Note    Patient:  Eduardo Randall      [id number]    YOB: 1940    MRN: [id number]       Acct: [de-identified]     PCP: Jessy JAQUEZ    Date of Admission: 12/5/2017    Chief Complaint: dizziness    Hospital Course: This patient was admitted to [facility] on 12/5/17 for chief complaints of dizziness. Patient was shopping at [facility] on 12/5 when she had sudden onset of dizziness, flushing, pale, diaphoresis. Her sister called EMS and pt brought to [facility]. She was noted to be orthostatic positive in the ED. Patient was recently admitted to [facility] twice within the last several weeks for palpitations, felt to be atrial fibrillation related. She was started on amiodarone. Her second admission was related to hyponatremia, felt to be medication related to hydrochlorothiazide. Subjective: Patient is sitting in bed. She reports feeling better since admission. She denies fever, chills, SOB, chest pain, abdominal pain, nausea, vomiting, bowel or urinary changes. Denies recurrence of admission symptoms. States when she was admitted for palpitations at [facility]- those were much different than symptoms experienced on 12/5.         Medications:  Reviewed    Infusion Medications    dextrose 5 % and 0.9 % NaCl       Scheduled Medications    allopurinol  300 mg Oral Daily    amiodarone  200 mg Oral Daily    amLODIPine  10 mg Oral Daily    anastrozole  1 mg Oral Daily    atorvastatin  40 mg Oral Nightly    apixaban  5 mg Oral BID    therapeutic multivitamin-minerals  1 tablet Oral Daily    pantoprazole  40 mg Oral QAM AC    valsartan  320 mg Oral Daily    sodium chloride flush  10 mL Intravenous 2 times per day    ciprofloxacin  400 mg Intravenous Q12H    insulin lispro  0-6 Units Subcutaneous TID WC    insulin lispro  0-3 Units Subcutaneous Nightly     PRN Meds: sodium chloride flush, acetaminophen, docusate sodium, ondansetron, glucose, dextrose, glucagon (rDNA), dextrose 5 % and 0.9 % NaCl      Intake/Output Summary (Last 24 hours) at 12/06/17 1022  Last data filed at 12/06/17 1021   Gross per 24 hour   Intake              450 ml   Output             1700 ml   Net            -1250 ml       Diet:  DIET CARB CONTROL; Carb Control: 4 carbs/meal (approximate 1800 kcals/day); Low Sodium (2 GM)    Exam:  BP (!) 143/63   Pulse 70   Temp 97.7 °F (36.5 °C) (Oral)   Resp 18   Ht 5' 2\" (1.575 m)   Wt 148 lb 4.8 oz (67.3 kg)   SpO2 95%   BMI 27.12 kg/m²     General appearance: No apparent distress, appears stated age and cooperative. HEENT: Pupils equal, round, and reactive to light. Conjunctivae/corneas clear. Oral mucosa is moist.   Neck: Supple, with full range of motion. No jugular venous distention. Trachea midline. Respiratory:  Normal respiratory effort. Clear to auscultation, bilaterally without Rales/Wheezes/Rhonchi. Cardiovascular: Regular rate and rhythm with normal S1/S2 without murmurs, rubs or gallops. Abdomen: Soft, non-tender, non-distended with normal bowel sounds. Musculoskeletal: No clubbing, cyanosis or edema bilaterally. Full range of motion without deformity. Skin: Skin color, texture, turgor normal.  No rashes or lesions. Neurologic:  Neurovascularly intact without any focal sensory/motor deficits.  Cranial nerves: II-XII intact, grossly non-focal.  Psychiatric: Alert and oriented,  Capillary Refill: Brisk,< 3 seconds   Peripheral Pulses: +2 palpable, equal bilaterally       Labs:   Recent Labs      12/05/17   1536  12/06/17   0350   WBC  14.7*  8.9   HGB  13.1  12.1   HCT  40.1  35.9*   PLT  280  255     Recent Labs      12/05/17   1536  12/06/17   0350   NA  139  138   K  4.4  4.4   CL  100  100   CO2  21*  25   BUN  13  14   CREATININE  1.3*  1.1   CALCIUM  9.6  9.3     Recent Labs      12/05/17   1536   AST  19   ALT  18   BILIDIR  <0.2   BILITOT  0.6   ALKPHOS  105     Recent Labs      12/05/17   1536   INR  1.43*     Urinalysis:    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 Anna Villalpando. TSH 8.370, free T4 normal at 1.59. Recommend outpatient follow up . 3. Questionable UTI (POA)--pt had u/a with mod leuk esterase, few bacteria, afebrile, asymptomatic. Urine culture: growth of contaminants. Discontinue IV Cipro per Dr. eGorgette Plummer. 4. Persistent Atrial Fibrillation--recently referral to [facility] for possible ablation. Follows with Dr. Sharyle Brood at [facility]. Continue amiodarone, Eliquis  5. CAD--per admission H&P had cath with nonobstructive disease earlier this year. On statin, ARB   6. Essential Hypertension, controlled--continue Valsartan, Norvasc - monitor orthostatic BP    7. DM-2, controlled--continue sliding scale    8. Breast Cancer--S/P right mastectomy 6/2017. Continue Arimidex  9. PAD--S/P left carotid endarterectomy. On statin  10. History of DVT-- on Eliquis   11. GERD-- on PPI  12. Leukocytosis--resolved. Likely reactive. Dispo: Will repeat orthostatic vital signs and monitor for recurrence of symptoms. Update: orthostatics positive. Will give 0.9 NS bolus of 500 mL. Recheck orthostatics after. Hold Valsartan and Norvasc.  Monitor overnight, repeat orthos in am.      I have discussed this patient's care and plan with Dr. Georgette Plummer   Electronically signed by Lucy Caicedo PA-C on 12/6/2017 at 10:22 AM

## 2017-12-06 NOTE — PLAN OF CARE
Problem: Nutrition  Goal: Optimal nutrition therapy  Outcome: Ongoing  Nutrition Problem: Inadequate oral intake  Intervention: Food and/or Nutrient Delivery: Continue current diet, Vitamin Supplement  Nutritional Goals: Pt will consume 75% or more of meals during LOS

## 2017-12-06 NOTE — FLOWSHEET NOTE
12/06/17 1636   Provider Notification   Reason for Communication Review case   Provider Name [facility]   Provider Notification Physician Assistant   Method of Communication Secure Message   Response Waiting for response   Notification Time [id number]     Let Mike Gary know the orthostatic BP's of patient. Waiting for response at this time.

## 2017-12-06 NOTE — CONSULTS
Consults   Nutrition Assessment    Type and Reason for Visit: Initial, Consult (Decreased Appetite/Intake)    Nutrition Recommendations: Continue current diet, ONS, and MVI w/minerals daily as ordered. Malnutrition Assessment:  · Malnutrition Status: At risk for malnutrition    Nutrition Diagnosis:   · Problem: Inadequate oral intake  · Etiology: related to Insufficient energy/nutrient consumption     Signs and symptoms:  as evidenced by Diet history of poor intake    Nutrition Assessment:  · Subjective Assessment: Pt admitted d/t dizziness/near syncope. Pt seen- she reports decreased appetite over the past few months. She reports she usually eats x3 small meals daily. Pt states sometimes she will only eat yogurt for a meal or a sandwich and a baked potato with lunch or dinner. Pt states she has had Carb Control diet education in the past but does not follow it at home. Provided pt with a Carbohydrate Controlled handout from the [place] on 12/6. Reviewed HgA1C of 12.1% on 12/6 and discussed the importance of follow a carb. control diet, how to count carbs, how to read a food label, and how many carbs. to consume with each meal. Answered all questions and concerns at this time. RD contact information provided during LOS. Pt denies N/V/D/C or chewing/swallowing difficulties with food. Pt reports stable weight over the past few months around 143 lbs.   · Wound Type: None  · Current Nutrition Therapies:  · Oral Diet Orders: Carb Control 4 Carbs/Meal, 2gm Sodium   · Oral Diet intake: % (of breakfast this morning)  · Oral Nutrition Supplement (ONS) Orders: Diabetic Oral Supplement (Glucerna BID)  · ONS intake:  (Initiated today)  · Anthropometric Measures:  · Ht: 5' 2\" (157.5 cm)   · Current Body Wt: 148 lb 4.8 oz (67.3 kg) (12/5; no edema noted)  · Admission Body Wt: 148 lb 4.8 oz (67.3 kg) (12/5; no edema noted)  · Usual Body Wt: 143 lb (64.9 kg) (per pt report)  · % Weight Change: no weight loss per pt

## 2017-12-07 VITALS
DIASTOLIC BLOOD PRESSURE: 66 MMHG | HEART RATE: 71 BPM | WEIGHT: 148.38 LBS | BODY MASS INDEX: 27.3 KG/M2 | RESPIRATION RATE: 16 BRPM | SYSTOLIC BLOOD PRESSURE: 134 MMHG | OXYGEN SATURATION: 99 % | HEIGHT: 62 IN | TEMPERATURE: 97.2 F

## 2017-12-07 LAB
ANION GAP SERPL CALCULATED.3IONS-SCNC: 10 MEQ/L (ref 8–16)
BUN BLDV-MCNC: 15 MG/DL (ref 7–22)
CALCIUM SERPL-MCNC: 9.4 MG/DL (ref 8.5–10.5)
CHLORIDE BLD-SCNC: 102 MEQ/L (ref 98–111)
CO2: 26 MEQ/L (ref 23–33)
CREAT SERPL-MCNC: 0.9 MG/DL (ref 0.4–1.2)
GFR SERPL CREATININE-BSD FRML MDRD: 61 ML/MIN/1.73M2
GLUCOSE BLD-MCNC: 107 MG/DL (ref 70–108)
GLUCOSE BLD-MCNC: 64 MG/DL (ref 70–108)
GLUCOSE BLD-MCNC: 89 MG/DL (ref 70–108)
HCT VFR BLD CALC: 36.5 % (ref 37–47)
HEMOGLOBIN: 12.1 GM/DL (ref 12–16)
MAGNESIUM: 1.6 MG/DL (ref 1.6–2.4)
MCH RBC QN AUTO: 30.1 PG (ref 27–31)
MCHC RBC AUTO-ENTMCNC: 33.2 GM/DL (ref 33–37)
MCV RBC AUTO: 90.7 FL (ref 81–99)
PDW BLD-RTO: 14.7 % (ref 11.5–14.5)
PLATELET # BLD: 250 THOU/MM3 (ref 130–400)
PMV BLD AUTO: 8.5 MCM (ref 7.4–10.4)
POTASSIUM SERPL-SCNC: 4.6 MEQ/L (ref 3.5–5.2)
RBC # BLD: 4.03 MILL/MM3 (ref 4.2–5.4)
SODIUM BLD-SCNC: 138 MEQ/L (ref 135–145)
WBC # BLD: 8.1 THOU/MM3 (ref 4.8–10.8)

## 2017-12-07 PROCEDURE — 99239 HOSP IP/OBS DSCHRG MGMT >30: CPT | Performed by: PHYSICIAN ASSISTANT

## 2017-12-07 PROCEDURE — 83735 ASSAY OF MAGNESIUM: CPT

## 2017-12-07 PROCEDURE — 6360000002 HC RX W HCPCS: Performed by: PHYSICIAN ASSISTANT

## 2017-12-07 PROCEDURE — 82948 REAGENT STRIP/BLOOD GLUCOSE: CPT

## 2017-12-07 PROCEDURE — 85027 COMPLETE CBC AUTOMATED: CPT

## 2017-12-07 PROCEDURE — 80048 BASIC METABOLIC PNL TOTAL CA: CPT

## 2017-12-07 PROCEDURE — 36415 COLL VENOUS BLD VENIPUNCTURE: CPT

## 2017-12-07 PROCEDURE — 2580000003 HC RX 258: Performed by: NURSE PRACTITIONER

## 2017-12-07 PROCEDURE — 6370000000 HC RX 637 (ALT 250 FOR IP): Performed by: NURSE PRACTITIONER

## 2017-12-07 RX ORDER — MAGNESIUM SULFATE IN WATER 40 MG/ML
2 INJECTION, SOLUTION INTRAVENOUS ONCE
Status: COMPLETED | OUTPATIENT
Start: 2017-12-07 | End: 2017-12-07

## 2017-12-07 RX ADMIN — MAGNESIUM SULFATE HEPTAHYDRATE 2 G: 40 INJECTION, SOLUTION INTRAVENOUS at 13:00

## 2017-12-07 RX ADMIN — Medication 10 ML: at 08:09

## 2017-12-07 RX ADMIN — PANTOPRAZOLE SODIUM 40 MG: 40 TABLET, DELAYED RELEASE ORAL at 04:05

## 2017-12-07 RX ADMIN — AMIODARONE HYDROCHLORIDE 200 MG: 200 TABLET ORAL at 08:06

## 2017-12-07 RX ADMIN — ALLOPURINOL 300 MG: 300 TABLET ORAL at 08:05

## 2017-12-07 RX ADMIN — MULTIPLE VITAMINS W/ MINERALS TAB 1 TABLET: TAB at 08:06

## 2017-12-07 RX ADMIN — ANASTROZOLE 1 MG: 1 TABLET ORAL at 08:09

## 2017-12-07 NOTE — DISCHARGE SUMMARY
discharge summary is in conjunction with any daily progress note from day of discharge. Hospital Course:   Rl Fong is a 69 y.o. female admitted to [address] [place] [address] on 12/5/2017 for chief complaints of dizziness with near syncope. The patient had been out shopping at [place] on 12/5/17 when she had sudden onset of dizziness, flushing, diaphoresis and was pale. She was noted to be orthostatic positive in the ED. Recheck of orthostatics were positive on 12/6 and corrected with IV fluid bolus. Home medications of Valsartan and Norvasc were held and repeat orthostatics were negative. Both Valsartan and Norvasc were held on discharge per Dr. Seema Baca. The patient was instructed to take blood pressure recording daily. She will follow up with her PCP and her cardiologist in [facility]. Of note, the patient was recently admitted to [facility] twice within the last two weeks. The first admission was for palpitations, found to be in Afib (has paroxysmal afib) and sotalol discontinued, started on Amiodarone. Second admission was for hyponatremia and home medication of hydrochlorothiazide was discontinued. Exam:     Vitals:  Vitals:    12/07/17 0404 12/07/17 0457 12/07/17 0800 12/07/17 1155   BP: (!) 119/57  (!) 159/72 134/66   Pulse: 76  69 71   Resp: 16  16 16   Temp: 97.7 °F (36.5 °C)  97.9 °F (36.6 °C) 97.2 °F (36.2 °C)   TempSrc: Oral  Oral Oral   SpO2: 95%  97% 99%   Weight:  148 lb 6 oz (67.3 kg)     Height:         Weight: Weight: 148 lb 6 oz (67.3 kg)     24 hour intake/output:    Intake/Output Summary (Last 24 hours) at 12/07/17 1948  Last data filed at 12/07/17 4923   Gross per 24 hour   Intake              640 ml   Output              900 ml   Net             -260 ml         General appearance:  No apparent distress, appears stated age and cooperative. HEENT:  Normal cephalic, atraumatic without obvious deformity. Pupils equal, round, and reactive to light.   Extra ocular muscles tolerated  Diet: Dietary Nutrition Supplements: Diabetic Oral Supplement      Follow-up visits:   Sarah Comer[address] [address] [phone]    On 12/11/2017  [facility]  [facility] [id number] [phone]    On 12/22/2017  145PM         Discharge Medications:      Medication List      CONTINUE taking these medications    allopurinol 300 MG tablet  Commonly known as:  ZYLOPRIM     amiodarone 200 MG tablet  Commonly known as:  CORDARONE     anastrozole 1 MG tablet  Commonly known as:  ARIMIDEX     atorvastatin 40 MG tablet  Commonly known as:  LIPITOR     Co Q-10 100 MG Caps     ELIQUIS 5 MG Tabs tablet  Generic drug:  apixaban     metFORMIN 500 MG extended release tablet  Commonly known as:  GLUCOPHAGE-XR     pantoprazole 40 MG tablet  Commonly known as:  PROTONIX     therapeutic multivitamin-minerals tablet        STOP taking these medications    amLODIPine 10 MG tablet  Commonly known as:  NORVASC     sotalol 80 MG tablet  Commonly known as:  BETAPACE     valsartan 320 MG tablet  Commonly known as:  DIOVAN          --Sotalol had been discontinued by her cardiologist 11/2017    Time Spent on discharge is more than 35 minutes  in the examination, evaluation, counseling and review of medications and discharge plan. Signed: Thank you Shira Quiros for the opportunity to be involved in this patient's care.     I have discussed this patient's care and plan with Dr. Allyn Sarkar   Electronically signed by Luci Weiner PA-C on 12/7/2017 at 7:48 PM

## 2017-12-07 NOTE — CARE COORDINATION
12/7/17, 12:55 PM    Discharge plan discussed by  and . Discharge plan reviewed with patient/ family. Patient/ family verbalize understanding of discharge plan and are in agreement with plan. Understanding was demonstrated using the teach back method. IMM Letter  IMM Letter date given[de-identified] 12/06/17  IMM Letter time given[de-identified] 1400       Discharge order in place. Plans home where she lives with grandson. No new services.

## 2017-12-07 NOTE — PROGRESS NOTES
Discharge teaching and instructions for diagnosis/procedure of syncope completed with patient using teachback method. AVS reviewed. Printed prescriptions given to patient. Patient voiced understanding regarding prescriptions, follow up appointments, and care of self at home.  Discharged in a wheelchair to  home with support per friend

## 2017-12-07 NOTE — PLAN OF CARE
Problem: Falls - Risk of  Goal: Absence of falls  Outcome: Ongoing  Patient a fall risk. Fall band intact, falling star magnet on door. Hourly visual rounding completed and bed alarm activated. Problem: Nutrition  Goal: Optimal nutrition therapy  Outcome: Ongoing  Tolerating food and drink at this time    Problem: Discharge Planning:  Goal: Discharged to appropriate level of care  Discharged to appropriate level of care   Outcome: Ongoing  Patient plans to return home at discharge. Comments: Care plan reviewed with patient. Patient verbalize understanding of the plan of care and contribute to goal setting.

## 2018-02-07 ENCOUNTER — OFFICE VISIT (OUTPATIENT)
Dept: INTERNAL MEDICINE CLINIC | Age: 78
End: 2018-02-07
Payer: MEDICARE

## 2018-02-07 VITALS
DIASTOLIC BLOOD PRESSURE: 74 MMHG | WEIGHT: 150.6 LBS | HEIGHT: 62 IN | BODY MASS INDEX: 27.71 KG/M2 | HEART RATE: 72 BPM | SYSTOLIC BLOOD PRESSURE: 158 MMHG

## 2018-02-07 DIAGNOSIS — I10 ESSENTIAL HYPERTENSION: ICD-10-CM

## 2018-02-07 DIAGNOSIS — I48.0 PAF (PAROXYSMAL ATRIAL FIBRILLATION) (HCC): ICD-10-CM

## 2018-02-07 DIAGNOSIS — R94.6 BORDERLINE ABNORMAL TFTS: Primary | ICD-10-CM

## 2018-02-07 DIAGNOSIS — E04.1 THYROID NODULE: ICD-10-CM

## 2018-02-07 PROCEDURE — G8598 ASA/ANTIPLAT THER USED: HCPCS | Performed by: INTERNAL MEDICINE

## 2018-02-07 PROCEDURE — 99213 OFFICE O/P EST LOW 20 MIN: CPT | Performed by: INTERNAL MEDICINE

## 2018-02-07 PROCEDURE — 1036F TOBACCO NON-USER: CPT | Performed by: INTERNAL MEDICINE

## 2018-02-07 PROCEDURE — 1090F PRES/ABSN URINE INCON ASSESS: CPT | Performed by: INTERNAL MEDICINE

## 2018-02-07 PROCEDURE — G8400 PT W/DXA NO RESULTS DOC: HCPCS | Performed by: INTERNAL MEDICINE

## 2018-02-07 PROCEDURE — G8419 CALC BMI OUT NRM PARAM NOF/U: HCPCS | Performed by: INTERNAL MEDICINE

## 2018-02-07 PROCEDURE — 4040F PNEUMOC VAC/ADMIN/RCVD: CPT | Performed by: INTERNAL MEDICINE

## 2018-02-07 PROCEDURE — G8427 DOCREV CUR MEDS BY ELIG CLIN: HCPCS | Performed by: INTERNAL MEDICINE

## 2018-02-07 PROCEDURE — 1123F ACP DISCUSS/DSCN MKR DOCD: CPT | Performed by: INTERNAL MEDICINE

## 2018-02-07 PROCEDURE — G8484 FLU IMMUNIZE NO ADMIN: HCPCS | Performed by: INTERNAL MEDICINE

## 2018-02-07 RX ORDER — VALSARTAN 160 MG/1
160 TABLET ORAL DAILY
COMMUNITY
End: 2020-01-01

## 2018-02-07 RX ORDER — AMLODIPINE BESYLATE 5 MG/1
5 TABLET ORAL DAILY
COMMUNITY
End: 2020-01-01

## 2018-02-07 ASSESSMENT — PATIENT HEALTH QUESTIONNAIRE - PHQ9
SUM OF ALL RESPONSES TO PHQ9 QUESTIONS 1 & 2: 0
1. LITTLE INTEREST OR PLEASURE IN DOING THINGS: 0
SUM OF ALL RESPONSES TO PHQ QUESTIONS 1-9: 0
2. FEELING DOWN, DEPRESSED OR HOPELESS: 0

## 2018-05-02 LAB
T4 FREE: 0.7 NG/DL (ref 0.61–1.12)
TSH REFLEX: 17.75 MCIU/ML (ref 0.49–4.67)

## 2018-05-04 LAB — THYROID PEROXIDASE ANTIBODY: 0.4 IU/ML

## 2018-05-09 ENCOUNTER — OFFICE VISIT (OUTPATIENT)
Dept: INTERNAL MEDICINE CLINIC | Age: 78
End: 2018-05-09
Payer: MEDICARE

## 2018-05-09 VITALS
SYSTOLIC BLOOD PRESSURE: 128 MMHG | HEIGHT: 62 IN | HEART RATE: 82 BPM | WEIGHT: 150.6 LBS | DIASTOLIC BLOOD PRESSURE: 74 MMHG | BODY MASS INDEX: 27.71 KG/M2

## 2018-05-09 DIAGNOSIS — E03.9 HYPOTHYROIDISM, UNSPECIFIED TYPE: Primary | ICD-10-CM

## 2018-05-09 DIAGNOSIS — I10 ESSENTIAL HYPERTENSION: ICD-10-CM

## 2018-05-09 DIAGNOSIS — E04.2 MULTIPLE THYROID NODULES: ICD-10-CM

## 2018-05-09 DIAGNOSIS — Z86.79 S/P ABLATION OF ATRIAL FIBRILLATION: ICD-10-CM

## 2018-05-09 DIAGNOSIS — I48.0 PAF (PAROXYSMAL ATRIAL FIBRILLATION) (HCC): ICD-10-CM

## 2018-05-09 DIAGNOSIS — Z98.890 S/P ABLATION OF ATRIAL FIBRILLATION: ICD-10-CM

## 2018-05-09 PROCEDURE — G8419 CALC BMI OUT NRM PARAM NOF/U: HCPCS | Performed by: INTERNAL MEDICINE

## 2018-05-09 PROCEDURE — G8427 DOCREV CUR MEDS BY ELIG CLIN: HCPCS | Performed by: INTERNAL MEDICINE

## 2018-05-09 PROCEDURE — 99214 OFFICE O/P EST MOD 30 MIN: CPT | Performed by: INTERNAL MEDICINE

## 2018-05-09 PROCEDURE — 1036F TOBACCO NON-USER: CPT | Performed by: INTERNAL MEDICINE

## 2018-05-09 PROCEDURE — G8598 ASA/ANTIPLAT THER USED: HCPCS | Performed by: INTERNAL MEDICINE

## 2018-05-09 PROCEDURE — G8400 PT W/DXA NO RESULTS DOC: HCPCS | Performed by: INTERNAL MEDICINE

## 2018-05-09 PROCEDURE — 1123F ACP DISCUSS/DSCN MKR DOCD: CPT | Performed by: INTERNAL MEDICINE

## 2018-05-09 PROCEDURE — 4040F PNEUMOC VAC/ADMIN/RCVD: CPT | Performed by: INTERNAL MEDICINE

## 2018-05-09 PROCEDURE — 1090F PRES/ABSN URINE INCON ASSESS: CPT | Performed by: INTERNAL MEDICINE

## 2018-05-09 RX ORDER — LEVOTHYROXINE SODIUM 0.05 MG/1
50 TABLET ORAL DAILY
Qty: 30 TABLET | Refills: 3 | Status: SHIPPED | OUTPATIENT
Start: 2018-05-09 | End: 2018-12-19 | Stop reason: DRUGHIGH

## 2018-06-20 LAB
T4 FREE: 1.07 NG/DL (ref 0.61–1.12)
TSH SERPL DL<=0.05 MIU/L-ACNC: 9.47 MCIU/ML (ref 0.49–4.67)

## 2018-06-21 ENCOUNTER — TELEPHONE (OUTPATIENT)
Dept: INTERNAL MEDICINE CLINIC | Age: 78
End: 2018-06-21

## 2018-06-27 ENCOUNTER — OFFICE VISIT (OUTPATIENT)
Dept: INTERNAL MEDICINE CLINIC | Age: 78
End: 2018-06-27
Payer: MEDICARE

## 2018-06-27 VITALS
DIASTOLIC BLOOD PRESSURE: 74 MMHG | WEIGHT: 141 LBS | BODY MASS INDEX: 26.62 KG/M2 | HEIGHT: 61 IN | HEART RATE: 80 BPM | SYSTOLIC BLOOD PRESSURE: 152 MMHG

## 2018-06-27 DIAGNOSIS — I48.0 PAF (PAROXYSMAL ATRIAL FIBRILLATION) (HCC): ICD-10-CM

## 2018-06-27 DIAGNOSIS — E03.9 HYPOTHYROIDISM, UNSPECIFIED TYPE: Primary | ICD-10-CM

## 2018-06-27 DIAGNOSIS — E06.3 HASHIMOTO'S DISEASE: ICD-10-CM

## 2018-06-27 DIAGNOSIS — E11.9 CONTROLLED TYPE 2 DIABETES MELLITUS WITHOUT COMPLICATION, WITHOUT LONG-TERM CURRENT USE OF INSULIN (HCC): ICD-10-CM

## 2018-06-27 DIAGNOSIS — I10 ESSENTIAL HYPERTENSION: ICD-10-CM

## 2018-06-27 DIAGNOSIS — I73.9 PAD (PERIPHERAL ARTERY DISEASE) (HCC): ICD-10-CM

## 2018-06-27 PROCEDURE — G8419 CALC BMI OUT NRM PARAM NOF/U: HCPCS | Performed by: INTERNAL MEDICINE

## 2018-06-27 PROCEDURE — 1036F TOBACCO NON-USER: CPT | Performed by: INTERNAL MEDICINE

## 2018-06-27 PROCEDURE — G8400 PT W/DXA NO RESULTS DOC: HCPCS | Performed by: INTERNAL MEDICINE

## 2018-06-27 PROCEDURE — 1123F ACP DISCUSS/DSCN MKR DOCD: CPT | Performed by: INTERNAL MEDICINE

## 2018-06-27 PROCEDURE — 4040F PNEUMOC VAC/ADMIN/RCVD: CPT | Performed by: INTERNAL MEDICINE

## 2018-06-27 PROCEDURE — G8598 ASA/ANTIPLAT THER USED: HCPCS | Performed by: INTERNAL MEDICINE

## 2018-06-27 PROCEDURE — G8427 DOCREV CUR MEDS BY ELIG CLIN: HCPCS | Performed by: INTERNAL MEDICINE

## 2018-06-27 PROCEDURE — 99213 OFFICE O/P EST LOW 20 MIN: CPT | Performed by: INTERNAL MEDICINE

## 2018-06-27 PROCEDURE — 1090F PRES/ABSN URINE INCON ASSESS: CPT | Performed by: INTERNAL MEDICINE

## 2018-06-27 RX ORDER — LEVOTHYROXINE SODIUM 0.1 MG/1
100 TABLET ORAL DAILY
Qty: 30 TABLET | Refills: 3 | Status: SHIPPED | OUTPATIENT
Start: 2018-06-27 | End: 2018-11-05 | Stop reason: SDUPTHER

## 2018-11-05 DIAGNOSIS — E03.9 HYPOTHYROIDISM, UNSPECIFIED TYPE: ICD-10-CM

## 2018-11-07 RX ORDER — LEVOTHYROXINE SODIUM 0.1 MG/1
100 TABLET ORAL DAILY
Qty: 30 TABLET | Refills: 3 | OUTPATIENT
Start: 2018-11-07 | End: 2019-02-27 | Stop reason: SDUPTHER

## 2018-12-19 ENCOUNTER — OFFICE VISIT (OUTPATIENT)
Dept: INTERNAL MEDICINE CLINIC | Age: 78
End: 2018-12-19
Payer: MEDICARE

## 2018-12-19 VITALS
SYSTOLIC BLOOD PRESSURE: 160 MMHG | OXYGEN SATURATION: 97 % | BODY MASS INDEX: 29.07 KG/M2 | HEIGHT: 61 IN | DIASTOLIC BLOOD PRESSURE: 80 MMHG | HEART RATE: 64 BPM | WEIGHT: 154 LBS

## 2018-12-19 DIAGNOSIS — E03.8 HYPOTHYROIDISM DUE TO HASHIMOTO'S THYROIDITIS: Primary | ICD-10-CM

## 2018-12-19 DIAGNOSIS — I73.9 PAD (PERIPHERAL ARTERY DISEASE) (HCC): ICD-10-CM

## 2018-12-19 DIAGNOSIS — E06.3 HYPOTHYROIDISM DUE TO HASHIMOTO'S THYROIDITIS: Primary | ICD-10-CM

## 2018-12-19 DIAGNOSIS — E04.1 THYROID NODULE: ICD-10-CM

## 2018-12-19 DIAGNOSIS — I10 ESSENTIAL HYPERTENSION: ICD-10-CM

## 2018-12-19 PROCEDURE — 4040F PNEUMOC VAC/ADMIN/RCVD: CPT | Performed by: INTERNAL MEDICINE

## 2018-12-19 PROCEDURE — 1101F PT FALLS ASSESS-DOCD LE1/YR: CPT | Performed by: INTERNAL MEDICINE

## 2018-12-19 PROCEDURE — G8419 CALC BMI OUT NRM PARAM NOF/U: HCPCS | Performed by: INTERNAL MEDICINE

## 2018-12-19 PROCEDURE — 99214 OFFICE O/P EST MOD 30 MIN: CPT | Performed by: INTERNAL MEDICINE

## 2018-12-19 PROCEDURE — 1090F PRES/ABSN URINE INCON ASSESS: CPT | Performed by: INTERNAL MEDICINE

## 2018-12-19 PROCEDURE — G8484 FLU IMMUNIZE NO ADMIN: HCPCS | Performed by: INTERNAL MEDICINE

## 2018-12-19 PROCEDURE — G8427 DOCREV CUR MEDS BY ELIG CLIN: HCPCS | Performed by: INTERNAL MEDICINE

## 2018-12-19 PROCEDURE — G8598 ASA/ANTIPLAT THER USED: HCPCS | Performed by: INTERNAL MEDICINE

## 2018-12-19 PROCEDURE — G8400 PT W/DXA NO RESULTS DOC: HCPCS | Performed by: INTERNAL MEDICINE

## 2018-12-19 PROCEDURE — 1036F TOBACCO NON-USER: CPT | Performed by: INTERNAL MEDICINE

## 2018-12-19 PROCEDURE — 1123F ACP DISCUSS/DSCN MKR DOCD: CPT | Performed by: INTERNAL MEDICINE

## 2019-02-27 ENCOUNTER — TELEPHONE (OUTPATIENT)
Dept: INTERNAL MEDICINE CLINIC | Age: 79
End: 2019-02-27

## 2019-02-27 DIAGNOSIS — E03.9 HYPOTHYROIDISM, UNSPECIFIED TYPE: ICD-10-CM

## 2019-03-01 RX ORDER — LEVOTHYROXINE SODIUM 0.1 MG/1
100 TABLET ORAL DAILY
Qty: 30 TABLET | Refills: 0 | Status: SHIPPED | OUTPATIENT
Start: 2019-03-01 | End: 2019-04-04 | Stop reason: SDUPTHER

## 2019-04-04 DIAGNOSIS — E03.9 HYPOTHYROIDISM, UNSPECIFIED TYPE: ICD-10-CM

## 2019-04-04 NOTE — TELEPHONE ENCOUNTER
Maia Mccoy called requesting a refill on the following medications:  Requested Prescriptions     Pending Prescriptions Disp Refills    levothyroxine (SYNTHROID) 100 MCG tablet 30 tablet 0     Sig: Take 1 tablet by mouth Daily     Pharmacy verified:  [facility]      Date of last visit: 12/19/18  Date of next visit (if applicable): Visit date not found

## 2019-04-07 DIAGNOSIS — E03.9 HYPOTHYROIDISM, UNSPECIFIED TYPE: ICD-10-CM

## 2019-04-10 RX ORDER — LEVOTHYROXINE SODIUM 0.1 MG/1
100 TABLET ORAL DAILY
Qty: 30 TABLET | Refills: 5 | Status: SHIPPED | OUTPATIENT
Start: 2019-04-10

## 2019-04-10 RX ORDER — LEVOTHYROXINE SODIUM 0.1 MG/1
TABLET ORAL
Qty: 30 TABLET | Refills: 3 | Status: SHIPPED | OUTPATIENT
Start: 2019-04-10 | End: 2020-01-01

## 2020-01-01 ENCOUNTER — OFFICE VISIT (OUTPATIENT)
Dept: NEPHROLOGY | Age: 80
End: 2020-01-01
Payer: MEDICARE

## 2020-01-01 ENCOUNTER — TELEPHONE (OUTPATIENT)
Dept: NEPHROLOGY | Age: 80
End: 2020-01-01

## 2020-01-01 VITALS
DIASTOLIC BLOOD PRESSURE: 82 MMHG | BODY MASS INDEX: 31.76 KG/M2 | WEIGHT: 168 LBS | OXYGEN SATURATION: 96 % | HEART RATE: 70 BPM | SYSTOLIC BLOOD PRESSURE: 142 MMHG

## 2020-01-01 VITALS
BODY MASS INDEX: 30.44 KG/M2 | HEART RATE: 51 BPM | DIASTOLIC BLOOD PRESSURE: 78 MMHG | OXYGEN SATURATION: 97 % | WEIGHT: 161 LBS | SYSTOLIC BLOOD PRESSURE: 138 MMHG

## 2020-01-01 LAB
ALBUMIN SERPL-MCNC: 3 G/DL
ALP BLD-CCNC: 105 U/L
ALT SERPL-CCNC: 19 U/L
ANION GAP SERPL CALCULATED.3IONS-SCNC: 11 MMOL/L
AST SERPL-CCNC: 30 U/L
BASOPHILS ABSOLUTE: ABNORMAL
BASOPHILS RELATIVE PERCENT: ABNORMAL
BILIRUB SERPL-MCNC: 0.5 MG/DL (ref 0.1–1.4)
BILIRUBIN, URINE: NEGATIVE
BLOOD, URINE: NEGATIVE
BUN BLDV-MCNC: 38 MG/DL
CALCIUM SERPL-MCNC: 8.3 MG/DL
CHLORIDE BLD-SCNC: 101 MMOL/L
CLARITY: CLEAR
CO2: 32 MMOL/L
COLOR: YELLOW
CREAT SERPL-MCNC: 1.36 MG/DL
CREAT SERPL-MCNC: 1.94 MG/DL
CREATININE, URINE: 103.1
EOSINOPHILS ABSOLUTE: ABNORMAL
EOSINOPHILS RELATIVE PERCENT: ABNORMAL
GFR CALCULATED: 24
GLUCOSE BLD-MCNC: 163 MG/DL
GLUCOSE URINE: NEGATIVE
HCT VFR BLD CALC: 35.3 % (ref 36–46)
HEMOGLOBIN: 11.3 G/DL (ref 12–16)
KETONES, URINE: NEGATIVE
LEUKOCYTE ESTERASE, URINE: POSITIVE
LYMPHOCYTES ABSOLUTE: ABNORMAL
LYMPHOCYTES RELATIVE PERCENT: ABNORMAL
MCH RBC QN AUTO: ABNORMAL PG
MCHC RBC AUTO-ENTMCNC: ABNORMAL G/DL
MCV RBC AUTO: ABNORMAL FL
MICROALBUMIN/CREAT 24H UR: 1.6 MG/G{CREAT}
MICROALBUMIN/CREAT UR-RTO: 16
MONOCYTES ABSOLUTE: ABNORMAL
MONOCYTES RELATIVE PERCENT: ABNORMAL
NEUTROPHILS ABSOLUTE: ABNORMAL
NEUTROPHILS RELATIVE PERCENT: ABNORMAL
NITRITE, URINE: NEGATIVE
PH UA: 6.5 (ref 4.5–8)
PLATELET # BLD: 243 K/ΜL
PMV BLD AUTO: ABNORMAL FL
POTASSIUM (K+): 4.3
POTASSIUM SERPL-SCNC: 3.5 MMOL/L
PROTEIN UA: NEGATIVE
RBC # BLD: 3.99 10^6/ΜL
SODIUM BLD-SCNC: 140 MMOL/L
SPECIFIC GRAVITY, URINE: 1.01
TOTAL PROTEIN: 6.3
URIC ACID: 8.1
UROBILINOGEN, URINE: NORMAL
WBC # BLD: 5.85 10^3/ML

## 2020-01-01 PROCEDURE — 99213 OFFICE O/P EST LOW 20 MIN: CPT | Performed by: INTERNAL MEDICINE

## 2020-01-01 PROCEDURE — G8400 PT W/DXA NO RESULTS DOC: HCPCS | Performed by: INTERNAL MEDICINE

## 2020-01-01 PROCEDURE — G8417 CALC BMI ABV UP PARAM F/U: HCPCS | Performed by: INTERNAL MEDICINE

## 2020-01-01 PROCEDURE — G8484 FLU IMMUNIZE NO ADMIN: HCPCS | Performed by: INTERNAL MEDICINE

## 2020-01-01 PROCEDURE — 1123F ACP DISCUSS/DSCN MKR DOCD: CPT | Performed by: INTERNAL MEDICINE

## 2020-01-01 PROCEDURE — 4040F PNEUMOC VAC/ADMIN/RCVD: CPT | Performed by: INTERNAL MEDICINE

## 2020-01-01 PROCEDURE — 99204 OFFICE O/P NEW MOD 45 MIN: CPT | Performed by: INTERNAL MEDICINE

## 2020-01-01 PROCEDURE — 1090F PRES/ABSN URINE INCON ASSESS: CPT | Performed by: INTERNAL MEDICINE

## 2020-01-01 PROCEDURE — 1036F TOBACCO NON-USER: CPT | Performed by: INTERNAL MEDICINE

## 2020-01-01 PROCEDURE — G8427 DOCREV CUR MEDS BY ELIG CLIN: HCPCS | Performed by: INTERNAL MEDICINE

## 2020-01-01 RX ORDER — FUROSEMIDE 20 MG/1
20 TABLET ORAL DAILY
Qty: 30 TABLET | Refills: 1 | Status: SHIPPED | OUTPATIENT
Start: 2020-01-01 | End: 2020-01-01

## 2020-01-01 RX ORDER — AMLODIPINE BESYLATE 10 MG/1
10 TABLET ORAL DAILY
Qty: 30 TABLET | Refills: 1 | Status: SHIPPED | OUTPATIENT
Start: 2020-01-01

## 2020-01-01 RX ORDER — LOSARTAN POTASSIUM 100 MG/1
100 TABLET ORAL DAILY
COMMUNITY
End: 2020-01-01

## 2020-01-01 RX ORDER — CALCIUM CARBONATE 200(500)MG
1 TABLET,CHEWABLE ORAL 3 TIMES DAILY
COMMUNITY

## 2020-01-01 ASSESSMENT — ENCOUNTER SYMPTOMS
DIARRHEA: 0
ABDOMINAL PAIN: 0
EYES NEGATIVE: 1
COUGH: 0
SHORTNESS OF BREATH: 0
NAUSEA: 0
BACK PAIN: 0
VOMITING: 0

## 2020-02-05 ENCOUNTER — TELEPHONE (OUTPATIENT)
Dept: INTERNAL MEDICINE CLINIC | Age: 80
End: 2020-02-05

## 2020-12-10 NOTE — TELEPHONE ENCOUNTER
Patient stated it is just swelling. Stated that its not hard and it doesn't hurt.      150# today  157# yesterday    Orders placed to be drawn on monday

## 2020-12-10 NOTE — TELEPHONE ENCOUNTER
----- Message from Edd Ashby MD sent at 12/10/2020 10:06 AM EST -----  Please check with the patient how her swelling is. Also what her weight is. Hold Lasix until Sunday and restart on Monday. Creatinine is slightly worse.   Repeat BUN/creatinine/potassium on Monday

## 2020-12-23 NOTE — PROGRESS NOTES
[facility]        Outpatient Follow-Up note         12/23/2020 9:39 AM    Patient Name:   Mahogany Cheatham  Birthdate:    [birth year]  Primary Care Physician:  Bernadette Salamanca  [address] [place] [id number]  Dept: [phone]  Loc: [phone]     Chief Complaint / Reason for follow-up : Follow Up of chronic kidney disease/NEYMAR     Interval History :  Patient seen and examined by me.    Feels okay denies any complaints no chest pain or shortness of breath occasional leg swelling  No hospitalizations no medication changes     Past History :  Past Medical History:   Diagnosis Date    Atrial fibrillation ([address]     Blood circulation, collateral     [facility]     CAD (coronary artery disease)     Cancer ([facility] 06/2017    Breast    GERD (gastroesophageal reflux disease)     Hyperlipidemia     Hypertension     Other disorders of kidney and ureter in diseases classified elsewhere     Thyroid disease     Type 2 diabetes mellitus without complication ([facility]      Past Surgical History:   Procedure Laterality Date    APPENDECTOMY      BREAST SURGERY      right mastectomy    CAROTID ENDARTERECTOMY  01/04/2017    COLONOSCOPY      MASTECTOMY Right 06/2017        Medications :     Outpatient Medications Marked as Taking for the 12/23/20 encounter (Office Visit) with Priya Beth MD   Medication Sig Dispense Refill    losartan (COZAAR) 100 MG tablet Take 100 mg by mouth daily      metoprolol tartrate (LOPRESSOR) 25 MG tablet Take 25 mg by mouth 2 times daily      NONFORMULARY Take 2 capsules by mouth daily Chemo pill for Thyroid Cancer      calcium carbonate (TUMS) 500 MG chewable tablet Take 1 tablet by mouth 3 times daily      furosemide (LASIX) 20 MG tablet Take 1 tablet by mouth daily 30 tablet 1  levothyroxine (SYNTHROID) 100 MCG tablet Take 1 tablet by mouth Daily (Patient taking differently: Take 187 mcg by mouth Daily ) 30 tablet 5    Cholecalciferol (VITAMIN D PO) Take by mouth daily      amLODIPine (NORVASC) 5 MG tablet Take 5 mg by mouth daily       metFORMIN (GLUCOPHAGE-XR) 500 MG extended release tablet Take 500 mg by mouth daily (with breakfast)      allopurinol (ZYLOPRIM) 300 MG tablet Take 300 mg by mouth daily      ELIQUIS 5 MG TABS tablet Take 5 mg by mouth 2 times daily       atorvastatin (LIPITOR) 40 MG tablet Take 40 mg by mouth daily      Coenzyme Q10 (CO Q-10) 200 MG CAPS Take 200 mg by mouth daily          Vitals     /78 (Site: Left Upper Arm, Position: Sitting, Cuff Size: Medium Adult)   Pulse 51   Wt 161 lb (73 kg)   SpO2 97%   BMI 30.44 kg/m²  Wt Readings from Last 3 Encounters:   12/23/20 161 lb (73 kg)   11/25/20 168 lb (76.2 kg)   12/19/18 154 lb (69.9 kg)        Physical Exam     General -- no distress  Lungs -- clear  Heart -- S1, S2 heard, JVD - no  Abdomen - soft, non-tender  Extremities -- no edema  CNS - awake and alert    Labs, Radiology and Tests    Labs -    11/20 12/20          Potassium 4.2 3.5          BUN 14 38          Creatinine 1.47 1.94          eGFR 34 24                      UPCR            UMCR  16                        CT Scan -- 10/20  Kidneys:   Kidneys are normal in size. There is no hydronephrosis. There is a 0.4 cm  right lower pole calculus (series 2 image 50).  A subcentimeter hyperdense  lesion in the right interpolar region (series 503 image 46) is unchanged and  likely represents a hemorrhagic/proteinaceous cyst.     Other : old  lab data have been reviewed and noted that the patient's creatinine has been running around 1.4 since April 2020 however in 2019 it was like 1.0    Assessment 1. Renal -as per MDRD patient is chronic kidney disease stage III, exact etiology unclear possibly due to contrast nephropathy she almost had more than 10 CT scans with contrast within this year. Also she is on the chemotherapy called Laxo 292, which can cause elevated serum creatinine  -Creatinine has worsened patient looks clinically slightly on the dry side. Advised to stop Lasix take it as needed. Also discontinue the losartan. Drink 60 ounces of liquids per day  -Repeat lab work in a week to 10 days  2. Electrolytes-appear to be within normal limits  3. Essential hypertension running well. As I am discontinuing the losartan will increase the Norvasc to 10 mg p.o. daily and advised home blood pressure monitoring and call with readings  4. Leg swelling plan improved plan as mentioned above  5. Recent thyroid cancer status post surgery. 6. Hx of diabetes mellitus  7. Medications reviewed and D/W patient in detail  8. Follow-up in 4 weeks    Tests and orders placed this Encounter     Orders Placed This Encounter   Procedures    BUN    Creatinine, Serum    Potassium    Comprehensive Metabolic Panel       Wilbert Bianchi M.D  [facility].

## 2021-01-01 LAB
BASOPHILS ABSOLUTE: ABNORMAL
BASOPHILS RELATIVE PERCENT: ABNORMAL
BUN BLDV-MCNC: 18 MG/DL
CALCIUM SERPL-MCNC: 7.4 MG/DL
CHLORIDE BLD-SCNC: 101 MMOL/L
CO2: 24 MMOL/L
CREAT SERPL-MCNC: 0.88 MG/DL
EOSINOPHILS ABSOLUTE: ABNORMAL
EOSINOPHILS RELATIVE PERCENT: ABNORMAL
GFR CALCULATED: >60
GLUCOSE BLD-MCNC: 78 MG/DL
HCT VFR BLD CALC: 29 % (ref 36–46)
HEMOGLOBIN: 9.5 G/DL (ref 12–16)
LYMPHOCYTES ABSOLUTE: ABNORMAL
LYMPHOCYTES RELATIVE PERCENT: ABNORMAL
MCH RBC QN AUTO: ABNORMAL PG
MCHC RBC AUTO-ENTMCNC: ABNORMAL G/DL
MCV RBC AUTO: ABNORMAL FL
MONOCYTES ABSOLUTE: ABNORMAL
MONOCYTES RELATIVE PERCENT: ABNORMAL
NEUTROPHILS ABSOLUTE: ABNORMAL
NEUTROPHILS RELATIVE PERCENT: ABNORMAL
PLATELET # BLD: 194 K/ΜL
PMV BLD AUTO: ABNORMAL FL
POTASSIUM SERPL-SCNC: 4.3 MMOL/L
RBC # BLD: 3.38 10^6/ΜL
SODIUM BLD-SCNC: 138 MMOL/L
WBC # BLD: 5 10^3/ML